# Patient Record
Sex: MALE | Race: WHITE | NOT HISPANIC OR LATINO | Employment: FULL TIME | URBAN - METROPOLITAN AREA
[De-identification: names, ages, dates, MRNs, and addresses within clinical notes are randomized per-mention and may not be internally consistent; named-entity substitution may affect disease eponyms.]

---

## 2017-08-29 ENCOUNTER — HOSPITAL ENCOUNTER (EMERGENCY)
Facility: HOSPITAL | Age: 22
Discharge: HOME | End: 2017-08-29
Payer: OTHER MISCELLANEOUS

## 2017-08-29 ENCOUNTER — APPOINTMENT (EMERGENCY)
Dept: RADIOLOGY | Facility: HOSPITAL | Age: 22
End: 2017-08-29
Payer: OTHER MISCELLANEOUS

## 2017-08-29 VITALS
OXYGEN SATURATION: 100 % | TEMPERATURE: 97.1 F | SYSTOLIC BLOOD PRESSURE: 116 MMHG | WEIGHT: 125 LBS | HEART RATE: 69 BPM | RESPIRATION RATE: 18 BRPM | DIASTOLIC BLOOD PRESSURE: 71 MMHG

## 2017-08-29 DIAGNOSIS — S62.629A: Primary | ICD-10-CM

## 2017-08-29 PROCEDURE — 73140 X-RAY EXAM OF FINGER(S): CPT

## 2017-08-29 PROCEDURE — 99283 EMERGENCY DEPT VISIT LOW MDM: CPT

## 2017-08-29 RX ORDER — NAPROXEN 500 MG/1
500 TABLET ORAL 2 TIMES DAILY WITH MEALS
Qty: 10 TABLET | Refills: 0 | Status: SHIPPED | OUTPATIENT
Start: 2017-08-29 | End: 2017-09-03

## 2017-08-29 RX ORDER — NAPROXEN 500 MG/1
500 TABLET ORAL 2 TIMES DAILY WITH MEALS
Qty: 10 TABLET | Refills: 0 | Status: SHIPPED | OUTPATIENT
Start: 2017-08-29 | End: 2017-08-29

## 2017-08-29 RX ORDER — METHOCARBAMOL 500 MG/1
500 TABLET, FILM COATED ORAL 4 TIMES DAILY
Qty: 20 TABLET | Refills: 0 | Status: SHIPPED | OUTPATIENT
Start: 2017-08-29 | End: 2017-08-29

## 2017-08-29 RX ORDER — SERTRALINE HYDROCHLORIDE 100 MG/1
150 TABLET, FILM COATED ORAL DAILY
COMMUNITY

## 2017-08-29 RX ORDER — LIDOCAINE 50 MG/G
1 PATCH TOPICAL DAILY
Qty: 6 PATCH | Refills: 0 | Status: SHIPPED | OUTPATIENT
Start: 2017-08-29 | End: 2017-08-29

## 2017-08-29 RX ORDER — BACITRACIN, NEOMYCIN, POLYMYXIN B 400; 3.5; 5 [USP'U]/G; MG/G; [USP'U]/G
1 OINTMENT TOPICAL ONCE
Status: COMPLETED | OUTPATIENT
Start: 2017-08-29 | End: 2017-08-29

## 2017-08-29 RX ADMIN — BACITRACIN ZINC, NEOMYCIN SULFATE, POLYMYXIN B SULFATE 1 SMALL APPLICATION: 3.5; 5000; 4 OINTMENT TOPICAL at 16:57

## 2017-09-06 ENCOUNTER — ALLSCRIPTS OFFICE VISIT (OUTPATIENT)
Dept: OTHER | Facility: OTHER | Age: 22
End: 2017-09-06

## 2017-09-25 ENCOUNTER — GENERIC CONVERSION - ENCOUNTER (OUTPATIENT)
Dept: OTHER | Facility: OTHER | Age: 22
End: 2017-09-25

## 2017-09-29 ENCOUNTER — ALLSCRIPTS OFFICE VISIT (OUTPATIENT)
Dept: OTHER | Facility: OTHER | Age: 22
End: 2017-09-29

## 2017-09-29 ENCOUNTER — HOSPITAL ENCOUNTER (OUTPATIENT)
Dept: RADIOLOGY | Facility: HOSPITAL | Age: 22
Discharge: HOME | End: 2017-09-29
Attending: ORTHOPAEDIC SURGERY
Payer: COMMERCIAL

## 2017-09-29 DIAGNOSIS — S62.629A CLOSED DISPLACED FRACTURE OF MIDDLE PHALANX OF FINGER: ICD-10-CM

## 2017-09-29 PROCEDURE — 73140 X-RAY EXAM OF FINGER(S): CPT

## 2017-10-23 ENCOUNTER — APPOINTMENT (OUTPATIENT)
Dept: PHYSICAL THERAPY | Age: 22
End: 2017-10-23
Payer: OTHER MISCELLANEOUS

## 2017-10-23 ENCOUNTER — GENERIC CONVERSION - ENCOUNTER (OUTPATIENT)
Dept: OTHER | Facility: OTHER | Age: 22
End: 2017-10-23

## 2017-10-23 PROCEDURE — 97161 PT EVAL LOW COMPLEX 20 MIN: CPT

## 2017-10-23 PROCEDURE — G8990 OTHER PT/OT CURRENT STATUS: HCPCS

## 2017-10-23 PROCEDURE — 97110 THERAPEUTIC EXERCISES: CPT

## 2017-10-23 PROCEDURE — G8991 OTHER PT/OT GOAL STATUS: HCPCS

## 2017-10-25 ENCOUNTER — APPOINTMENT (OUTPATIENT)
Dept: PHYSICAL THERAPY | Age: 22
End: 2017-10-25
Payer: OTHER MISCELLANEOUS

## 2017-10-25 PROCEDURE — 97110 THERAPEUTIC EXERCISES: CPT

## 2017-10-25 PROCEDURE — 97140 MANUAL THERAPY 1/> REGIONS: CPT

## 2017-10-25 PROCEDURE — 97018 PARAFFIN BATH THERAPY: CPT

## 2017-10-25 NOTE — PROGRESS NOTES
Assessment  1  Fracture of middle phalanx of finger of right hand (816 01) ([id number])    Discussion/Summary    Middle phalanx fracture right handfollow-up in 2 weeks repeat x-rays possibly change to lidia taping and possibly send to occupational therapy for range of motion  As far as work is concerned he can return to work he just can't use the right hand for swinging anything heavy  I advised him to do nothing more than riding with a pen with that hand  This will be in place until he comes back to see us and we'll repeat the x-rays  History of Present Illness  HPI: Patient comes in today with regards to his right index finger  He was swinging a mallet hit a wooden paddle  Subsequent had pain  This happened on August 28 didn't go to the ER until next day where x-rays were taken  He was placed in a splint and told to follow-up with orthopedics  Pain ranges from one out 10-6 out of 10 no previous injuries  Pain is well-controlled at this time  Past medical history is positive for depression review of systems unremarkable  He does smoke cigars about 5-7 a day for last 5 years  He does work as a   Review of Systems    Constitutional: No fever or chills, feels well, no tiredness, no recent weight loss or weight gain  Eyes: No complaints of red eyes, no eyesight problems  ENT: no complaints of loss of hearing, no nosebleeds, no sore throat  Cardiovascular: No complaints of chest pain, no palpitations, no leg claudication or lower extremity edema  Respiratory: No complaints of shortness of breath, no wheezing, no cough  Gastrointestinal: No complaints of abdominal pain, no constipation, no nausea or vomiting, no diarrhea or bloody stools  Genitourinary: No complaints of dysuria or incontinence, no hesitancy, no nocturia  Musculoskeletal: as noted in HPI  Integumentary: No complaints of skin rash or lesion, no itching or dry skin, no skin wounds     Neurological: No complaints of headache, no confusion, no numbness or tingling, no dizziness  Psychiatric: No suicidal thoughts, no anxiety, no depression  Endocrine: No muscle weakness, no frequent urination, no excessive thirst, no feelings of weakness  Past Medical History    The active problems and past medical history were reviewed and updated today  Surgical History    The surgical history was reviewed and updated today  Family History    The family history was reviewed and updated today  Social History  The social history was reviewed and updated today  Current Meds    The medication list was reviewed and updated today  Allergies  1  No Known Drug Allergies    Vitals   Recorded: [id number] 02:01PM   Height 5 ft 7 in   Weight 121 lb 6 08 oz   BMI Calculated 19 01   BSA Calculated 1 63     Physical Exam  Examination patient shows a superficial laceration on the dorsum of the finger that is in a subacute stage of healing  There is no erythema  He has been using bacitracin ointment  Incision or laceration is approximately 1-1/2 cm in length  On the radial side of the finger there is a hemorrhagic Yvone Fergusson that is healing  It's approximately 1 cm in diameter  Range of motion was not assessed today given the acuity of injury  Sensation is intact  He does have a strain finger  It is not in mallet finger deformity or swan-neck deformity     Constitutional - General appearance: Normal    Musculoskeletal - Digits and nails: Abnormal -- Inspection/palpation of joints, bones, and muscles: Abnormal    Cardiovascular - Pulses: Normal    Skin - Skin and subcutaneous tissue: Abnormal    Neurologic - Reflexes: Normal -- Sensation: Normal -- Upper extremity peripheral neuro exam: Normal    Psychiatric - Orientation to person, place, and time: Normal -- Mood and affect: Normal    Eyes   Conjunctiva and lids: Normal        Results/Data  I personally reviewed the films/images/results in the office today   My interpretation follows  X-ray Review Stable spiral fracture involving the middle phalanx index finger  It does not extend into the articular surface it is not angulated overrotated  Message  Return to work or school:   Neha Salazar is under my professional care  He was seen in my office on 9/6/17   He is able to return to work on  9/7/17    He is able to work with limitations (No use of the right hand except for caring less than 5 pounds or writing )  Jessica NUNEZ        Signatures   Electronically signed by : Jessica Norris DO; Sep  6 2017  2:13PM EST                       (Author)

## 2017-11-01 ENCOUNTER — APPOINTMENT (OUTPATIENT)
Dept: PHYSICAL THERAPY | Age: 22
End: 2017-11-01
Payer: OTHER MISCELLANEOUS

## 2017-11-02 ENCOUNTER — APPOINTMENT (OUTPATIENT)
Dept: PHYSICAL THERAPY | Age: 22
End: 2017-11-02
Payer: OTHER MISCELLANEOUS

## 2017-11-03 ENCOUNTER — APPOINTMENT (OUTPATIENT)
Dept: PHYSICAL THERAPY | Age: 22
End: 2017-11-03
Payer: OTHER MISCELLANEOUS

## 2017-11-06 ENCOUNTER — APPOINTMENT (OUTPATIENT)
Dept: PHYSICAL THERAPY | Age: 22
End: 2017-11-06
Payer: OTHER MISCELLANEOUS

## 2017-11-06 PROCEDURE — 97018 PARAFFIN BATH THERAPY: CPT

## 2017-11-06 PROCEDURE — 97140 MANUAL THERAPY 1/> REGIONS: CPT

## 2017-11-08 ENCOUNTER — APPOINTMENT (OUTPATIENT)
Dept: PHYSICAL THERAPY | Age: 22
End: 2017-11-08
Payer: OTHER MISCELLANEOUS

## 2017-11-08 PROCEDURE — 97140 MANUAL THERAPY 1/> REGIONS: CPT

## 2017-11-08 PROCEDURE — 97018 PARAFFIN BATH THERAPY: CPT

## 2017-11-08 PROCEDURE — 97110 THERAPEUTIC EXERCISES: CPT

## 2017-11-13 ENCOUNTER — APPOINTMENT (OUTPATIENT)
Dept: PHYSICAL THERAPY | Age: 22
End: 2017-11-13
Payer: OTHER MISCELLANEOUS

## 2017-11-16 ENCOUNTER — APPOINTMENT (OUTPATIENT)
Dept: PHYSICAL THERAPY | Age: 22
End: 2017-11-16
Payer: OTHER MISCELLANEOUS

## 2017-11-17 ENCOUNTER — GENERIC CONVERSION - ENCOUNTER (OUTPATIENT)
Dept: OTHER | Facility: OTHER | Age: 22
End: 2017-11-17

## 2017-11-17 ENCOUNTER — HOSPITAL ENCOUNTER (OUTPATIENT)
Dept: RADIOLOGY | Facility: HOSPITAL | Age: 22
Discharge: HOME | End: 2017-11-17
Attending: ORTHOPAEDIC SURGERY
Payer: COMMERCIAL

## 2017-11-17 DIAGNOSIS — S62.629A CLOSED DISPLACED FRACTURE OF MIDDLE PHALANX OF FINGER: ICD-10-CM

## 2017-11-17 PROCEDURE — 73140 X-RAY EXAM OF FINGER(S): CPT

## 2017-11-20 ENCOUNTER — APPOINTMENT (OUTPATIENT)
Dept: PHYSICAL THERAPY | Age: 22
End: 2017-11-20
Payer: OTHER MISCELLANEOUS

## 2017-11-22 ENCOUNTER — APPOINTMENT (OUTPATIENT)
Dept: PHYSICAL THERAPY | Age: 22
End: 2017-11-22
Payer: OTHER MISCELLANEOUS

## 2018-01-09 NOTE — MISCELLANEOUS
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

## 2018-01-10 NOTE — MISCELLANEOUS
Message  Return to work or school:   Alex Sandhu is under my professional care  He was seen in my office on 9/6/17   He is able to return to work on  9/7/17    He is able to work with limitations (No use of the right hand except for caring less than 5 pounds or writing )  Israel NUNEZ        Signatures   Electronically signed by : Israel Humphreys DO; Sep  6 2017  2:13PM EST                       (Author)

## 2018-01-12 VITALS — BODY MASS INDEX: 19.05 KG/M2 | WEIGHT: 121.38 LBS | HEIGHT: 67 IN

## 2018-01-13 NOTE — MISCELLANEOUS
Message  Return to work or school:   Christina Berumen is under my professional care  He was seen in my office on 09/29/2017     He is able to work with limitations (Continue with previous restrictions of no lifting more than 5 lb  No hammering  )  Christian NUNEZ        Signatures   Electronically signed by : Buck Ledezma DO; Sep 29 2017 11:34AM EST                       (Author)

## 2018-01-14 VITALS
HEIGHT: 67 IN | SYSTOLIC BLOOD PRESSURE: 118 MMHG | WEIGHT: 121.38 LBS | DIASTOLIC BLOOD PRESSURE: 80 MMHG | BODY MASS INDEX: 19.05 KG/M2

## 2018-01-22 VITALS
WEIGHT: 121.38 LBS | DIASTOLIC BLOOD PRESSURE: 71 MMHG | BODY MASS INDEX: 19.01 KG/M2 | HEART RATE: 91 BPM | SYSTOLIC BLOOD PRESSURE: 107 MMHG